# Patient Record
Sex: MALE | Race: WHITE | Employment: STUDENT | URBAN - NONMETROPOLITAN AREA
[De-identification: names, ages, dates, MRNs, and addresses within clinical notes are randomized per-mention and may not be internally consistent; named-entity substitution may affect disease eponyms.]

---

## 2022-12-20 ENCOUNTER — OFFICE VISIT (OUTPATIENT)
Dept: PRIMARY CARE CLINIC | Age: 27
End: 2022-12-20
Payer: COMMERCIAL

## 2022-12-20 ENCOUNTER — HOSPITAL ENCOUNTER (OUTPATIENT)
Age: 27
Setting detail: SPECIMEN
Discharge: HOME | End: 2022-12-20
Payer: COMMERCIAL

## 2022-12-20 VITALS
HEIGHT: 71 IN | OXYGEN SATURATION: 99 % | WEIGHT: 172 LBS | TEMPERATURE: 97.9 F | BODY MASS INDEX: 24.08 KG/M2 | DIASTOLIC BLOOD PRESSURE: 70 MMHG | HEART RATE: 99 BPM | SYSTOLIC BLOOD PRESSURE: 128 MMHG

## 2022-12-20 DIAGNOSIS — Z11.3 SCREEN FOR STD (SEXUALLY TRANSMITTED DISEASE): ICD-10-CM

## 2022-12-20 DIAGNOSIS — A54.9 GONORRHEA: Primary | ICD-10-CM

## 2022-12-20 LAB
BACTERIA: NORMAL
BILIRUBIN URINE: NEGATIVE
EPITHELIAL CELLS UA: NORMAL /HPF (ref 0–5)
GLUCOSE URINE: NEGATIVE
KETONES, URINE: NEGATIVE
LEUKOCYTE ESTERASE, URINE: ABNORMAL
NITRITE, URINE: NEGATIVE
PH UA: 7 (ref 5–6)
PROTEIN UA: NEGATIVE
RBC UA: NORMAL /HPF (ref 0–4)
SPECIFIC GRAVITY UA: 1.01 (ref 1.01–1.02)
URINE HGB: NEGATIVE
UROBILINOGEN, URINE: NORMAL
WBC UA: NORMAL /HPF (ref 0–4)

## 2022-12-20 PROCEDURE — 96372 THER/PROPH/DIAG INJ SC/IM: CPT | Performed by: FAMILY MEDICINE

## 2022-12-20 PROCEDURE — G8427 DOCREV CUR MEDS BY ELIG CLIN: HCPCS | Performed by: FAMILY MEDICINE

## 2022-12-20 PROCEDURE — 99203 OFFICE O/P NEW LOW 30 MIN: CPT | Performed by: FAMILY MEDICINE

## 2022-12-20 PROCEDURE — G8420 CALC BMI NORM PARAMETERS: HCPCS | Performed by: FAMILY MEDICINE

## 2022-12-20 PROCEDURE — G8484 FLU IMMUNIZE NO ADMIN: HCPCS | Performed by: FAMILY MEDICINE

## 2022-12-20 PROCEDURE — 87591 N.GONORRHOEAE DNA AMP PROB: CPT

## 2022-12-20 PROCEDURE — 81001 URINALYSIS AUTO W/SCOPE: CPT

## 2022-12-20 PROCEDURE — 4004F PT TOBACCO SCREEN RCVD TLK: CPT | Performed by: FAMILY MEDICINE

## 2022-12-20 PROCEDURE — 87491 CHLMYD TRACH DNA AMP PROBE: CPT

## 2022-12-20 RX ORDER — EMTRICITABINE AND TENOFOVIR DISOPROXIL FUMARATE 200; 300 MG/1; MG/1
TABLET, FILM COATED ORAL
COMMUNITY
Start: 2022-12-15

## 2022-12-20 RX ORDER — CEFTRIAXONE 500 MG/1
500 INJECTION, POWDER, FOR SOLUTION INTRAMUSCULAR; INTRAVENOUS ONCE
Status: COMPLETED | OUTPATIENT
Start: 2022-12-20 | End: 2022-12-20

## 2022-12-20 RX ADMIN — CEFTRIAXONE 500 MG: 500 INJECTION, POWDER, FOR SOLUTION INTRAMUSCULAR; INTRAVENOUS at 17:17

## 2022-12-20 ASSESSMENT — ENCOUNTER SYMPTOMS
SHORTNESS OF BREATH: 0
SORE THROAT: 0
CONSTIPATION: 0

## 2022-12-20 NOTE — PROGRESS NOTES
[facility] [id number]  [address] [address]  Dept: [phone]  Dept Fax: [phone]    Pauly Trujillo a 24 y.o. male who presents today for his medical conditions/complaints as notedbelow. Akilah Schwartz is c/o of   Chief Complaint   Patient presents with    Exposure to STD     Gonhorrea-received msg was positive       HPI:     here today for STD exposure. Exposure to STD  This is a new problem. The current episode started today. The problem has been unchanged. Pertinent negatives include no constipation, discolored urine, dysuria, frequency, hematuria, hesitancy, painful intercourse, shortness of breath, sore throat, urgency or urinary retention. Nothing aggravates the symptoms. He has tried nothing for the symptoms. He is sexually active. He never uses condoms. No, his partner does not have an STD. He got a text that he is positive for gonorrhea. His did a home test for PreP and he was called and told that he was positive. He did the test on 12/15. He only admits to one partner and that partner had negative routine STD testing done a month ago. His partner is also asymptomatic. History reviewed. No pertinent past medical history. Social History     Tobacco Use    Smoking status: Every Day     Types: Cigarettes    Smokeless tobacco: Never   Substance Use Topics    Alcohol use: Not on file     Current Outpatient Medications   Medication Sig Dispense Refill    emtricitabine-tenofovir (TRUVADA) 200-300 MG per tablet TAKE 1 TABLET BY MOUTH ONCE DAILY       Current Facility-Administered Medications   Medication Dose Route Frequency Provider Last Rate Last Admin    cefTRIAXone (ROCEPHIN) injection 500 mg  500 mg IntraMUSCular Once Alicia Fraire MD            No Known Allergies    Subjective:     Review of Systems   HENT:  Negative for sore throat. Respiratory:  Negative for shortness of breath. Gastrointestinal:  Negative for constipation. Genitourinary:  Negative for dysuria, frequency, hesitancy and urgency. Objective:      Physical Exam  Vitals and nursing note reviewed. Constitutional:       General: He is not in acute distress. Appearance: He is well-developed. Eyes:      Conjunctiva/sclera: Conjunctivae normal.   Cardiovascular:      Rate and Rhythm: Normal rate and regular rhythm. Heart sounds: Normal heart sounds. No murmur heard. Pulmonary:      Effort: Pulmonary effort is normal. No respiratory distress. Breath sounds: Normal breath sounds. No wheezing or rales. Musculoskeletal:         General: Normal range of motion. Cervical back: Normal range of motion and neck supple. Lymphadenopathy:      Cervical: No cervical adenopathy. Skin:     General: Skin is warm and dry. Findings: No rash. Neurological:      Mental Status: He is alert and oriented to person, place, and time. /70   Pulse 99   Temp 97.9 °F (36.6 °C)   Ht 5' 11\" (1.803 m)   Wt 172 lb (78 kg)   SpO2 99%   BMI 23.99 kg/m²     Assessment:       Diagnosis Orders   1. Gonorrhea  cefTRIAXone (ROCEPHIN) injection 500 mg      2.  Screen for STD (sexually transmitted disease)  Urinalysis with Reflex to Culture    C.trachomatis N.gonorrhoeae DNA, Urine         Hospital Outpatient Visit on 12/20/2022   Component Date Value Ref Range Status    Glucose, Ur 12/20/2022 NEGATIVE  NEGATIVE Final    Bilirubin Urine 12/20/2022 NEGATIVE  NEGATIVE Final    Ketones, Urine 12/20/2022 NEGATIVE  NEGATIVE Final    Specific Gravity, UA 12/20/2022 1.010  1.010 - 1.025 Final    Urine Hgb 12/20/2022 NEGATIVE  NEGATIVE Final    pH, UA 12/20/2022 7.0 (A)  5.0 - 6.0 Final    Protein, UA 12/20/2022 NEGATIVE  NEGATIVE Final    Urobilinogen, Urine 12/20/2022 Normal  Normal Final    Nitrite, Urine 12/20/2022 NEGATIVE  NEGATIVE Final    Leukocyte Esterase, Urine 12/20/2022 TRACE (A)  NEGATIVE Final    WBC, UA 12/20/2022 None  0 - 4 /HPF Final    RBC, UA 12/20/2022 None  0 - 4 /HPF Final    Epithelial Cells UA 12/20/2022 None  0 - 5 /HPF Final    Bacteria, UA 12/20/2022 None  None Final              Plan:       Gonorrhea: new; he is asymptomatic but the test he did for his STD screening was positive. I recommended treatment since he is going out of town before out test will be back to confirm. I recommended no sex until 7 days after he has been treated. I also recommended condom usage during all encounters in the future. Return if symptoms worsen or fail to improve. Orders Placed This Encounter   Procedures    C.trachomatis N.gonorrhoeae DNA, Urine     Standing Status:   Future     Number of Occurrences:   1     Standing Expiration Date:   3/20/2023    Urinalysis with Reflex to Culture     Standing Status:   Future     Number of Occurrences:   1     Standing Expiration Date:   3/20/2023     Order Specific Question:   SPECIFY(EX-CATH,MIDSTREAM,CYSTO,ETC)? Answer:   midstream     Orders Placed This Encounter   Medications    cefTRIAXone (ROCEPHIN) injection 500 mg     Order Specific Question:   Antimicrobial Indications     Answer:   STD infection     Order Specific Question:   Suspected Organism(s)     Answer:   gonorrhea       Patientgiven educational materials - see patient instructions. Discussed use, benefit,and side effects of prescribed medications. All patient questions answered. Ptvoiced understanding. Reviewed health maintenance. Instructed to continue currentmedications, diet and exercise. Patient agreed with treatment plan. Follow up asdirected.      Electronically signed by Ana M Alfaro MD on 12/20/2022 at 5:13 PM

## 2022-12-21 LAB
C. TRACHOMATIS DNA ,URINE: NEGATIVE
N. GONORRHOEAE DNA, URINE: NEGATIVE
SPECIMEN DESCRIPTION: NORMAL

## 2023-05-17 LAB — HIV 1/ 2 AG/AB SCREEN: NONREACTIVE

## 2023-05-18 LAB
CHLAMYDIA TRACH., AMPLIFIED: NEGATIVE
N. GONORRHEA, AMPLIFIED: NEGATIVE
SYPHILIS TOTAL AB: NONREACTIVE